# Patient Record
Sex: FEMALE | Race: BLACK OR AFRICAN AMERICAN | NOT HISPANIC OR LATINO | URBAN - METROPOLITAN AREA
[De-identification: names, ages, dates, MRNs, and addresses within clinical notes are randomized per-mention and may not be internally consistent; named-entity substitution may affect disease eponyms.]

---

## 2023-06-13 ENCOUNTER — EMERGENCY (EMERGENCY)
Facility: HOSPITAL | Age: 58
LOS: 1 days | Discharge: LEFT BEFORE DISCHARGE | End: 2023-06-13
Attending: STUDENT IN AN ORGANIZED HEALTH CARE EDUCATION/TRAINING PROGRAM
Payer: MEDICAID

## 2023-06-13 VITALS
HEIGHT: 72 IN | HEART RATE: 78 BPM | TEMPERATURE: 98 F | RESPIRATION RATE: 20 BRPM | OXYGEN SATURATION: 94 % | WEIGHT: 255.07 LBS | SYSTOLIC BLOOD PRESSURE: 141 MMHG | DIASTOLIC BLOOD PRESSURE: 107 MMHG

## 2023-06-13 VITALS
RESPIRATION RATE: 18 BRPM | OXYGEN SATURATION: 98 % | TEMPERATURE: 97 F | DIASTOLIC BLOOD PRESSURE: 91 MMHG | HEART RATE: 77 BPM | SYSTOLIC BLOOD PRESSURE: 134 MMHG

## 2023-06-13 PROCEDURE — 73552 X-RAY EXAM OF FEMUR 2/>: CPT | Mod: 26,RT

## 2023-06-13 PROCEDURE — 73502 X-RAY EXAM HIP UNI 2-3 VIEWS: CPT | Mod: 26,RT

## 2023-06-13 PROCEDURE — 96372 THER/PROPH/DIAG INJ SC/IM: CPT

## 2023-06-13 PROCEDURE — 99284 EMERGENCY DEPT VISIT MOD MDM: CPT | Mod: 25

## 2023-06-13 PROCEDURE — 73562 X-RAY EXAM OF KNEE 3: CPT | Mod: 26,RT

## 2023-06-13 PROCEDURE — 72170 X-RAY EXAM OF PELVIS: CPT

## 2023-06-13 PROCEDURE — 99285 EMERGENCY DEPT VISIT HI MDM: CPT

## 2023-06-13 PROCEDURE — 73502 X-RAY EXAM HIP UNI 2-3 VIEWS: CPT

## 2023-06-13 PROCEDURE — 93005 ELECTROCARDIOGRAM TRACING: CPT

## 2023-06-13 PROCEDURE — 73552 X-RAY EXAM OF FEMUR 2/>: CPT

## 2023-06-13 PROCEDURE — 73562 X-RAY EXAM OF KNEE 3: CPT

## 2023-06-13 RX ORDER — LIDOCAINE 4 G/100G
1 CREAM TOPICAL ONCE
Refills: 0 | Status: COMPLETED | OUTPATIENT
Start: 2023-06-13 | End: 2023-06-13

## 2023-06-13 RX ORDER — MORPHINE SULFATE 50 MG/1
4 CAPSULE, EXTENDED RELEASE ORAL ONCE
Refills: 0 | Status: DISCONTINUED | OUTPATIENT
Start: 2023-06-13 | End: 2023-06-13

## 2023-06-13 RX ORDER — KETOROLAC TROMETHAMINE 30 MG/ML
30 SYRINGE (ML) INJECTION ONCE
Refills: 0 | Status: DISCONTINUED | OUTPATIENT
Start: 2023-06-13 | End: 2023-06-13

## 2023-06-13 RX ORDER — ACETAMINOPHEN 500 MG
975 TABLET ORAL ONCE
Refills: 0 | Status: DISCONTINUED | OUTPATIENT
Start: 2023-06-13 | End: 2023-06-13

## 2023-06-13 RX ORDER — DIAZEPAM 5 MG
5 TABLET ORAL ONCE
Refills: 0 | Status: DISCONTINUED | OUTPATIENT
Start: 2023-06-13 | End: 2023-06-13

## 2023-06-13 RX ADMIN — Medication 30 MILLIGRAM(S): at 16:40

## 2023-06-13 RX ADMIN — Medication 5 MILLIGRAM(S): at 17:01

## 2023-06-13 RX ADMIN — LIDOCAINE 1 PATCH: 4 CREAM TOPICAL at 16:40
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
## 2023-06-13 NOTE — ED PROVIDER NOTE - PROGRESS NOTE DETAILS
Attending MD Alcantara: Reviewed XR findings with patient.  Reports that if it is not her bones "it is an attack from the devil".  Explained that if she is still having pain and is unable to walk, we can obtain at CT scan.  Declined.  Would like to trial ambulation.  Will trial. Patient reassessed, still reports moderate pain and refuses to road test.  I explained all imaging results with patient who verbalized understanding.  We recommended admission for pain control and PT eval, patient declined stating she wishes to leave and go to a state hospital if she does not have insurance.  I explained necessity for overnight admission if he stable to ambulate on her own without assistance, patient verbalized understanding would like to leave AGAINST MEDICAL ADVICE, states she will return to another hospital if she feels she has to come back.  The patient has decided to leave against medical advice.  The patient is AAOx3, not intoxicated, and displays normal decision making ability. We discussed all risks, benefits, and alternatives to the progression of treatment and the potential dangers of leaving including but not limited to permanent disability, injury, and death.  The patient was instructed that they are welcome to change their decision to leave against medical advice and return to the emergency department at any time and for any reason in order to allow us to render care. - Imer Stephens PA-C Attending MD Alcantara: Observed ambulating to bathroom with antalgic gait pt seen by RENETTA Benson called to see concern for safety at home, pt aox4 and demonstrated capacity, left AMA. stable at time of discharge - Imer Stephens PA-C

## 2023-06-13 NOTE — ED ADULT NURSE NOTE - NSFALLRISKINTERV_ED_ALL_ED

## 2023-06-13 NOTE — ED PROVIDER NOTE - CLINICAL SUMMARY MEDICAL DECISION MAKING FREE TEXT BOX
see attestation  no leg swelling unlikely DVT, strong DP pulse,normal perfusion unlikely ischemic limb, soft compartments in the buttocks, and b/l lower eextreemities unlikely compartment syndrome

## 2023-06-13 NOTE — ED PROVIDER NOTE - WR ORDER NAME 1
Normal vision: sees adequately in most situations; can see medication labels, newsprint Xray Pelvis AP only
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
## 2023-06-13 NOTE — ED PROVIDER NOTE - NS ED ATTENDING STATEMENT MOD
This was a shared visit with the PALMER. I reviewed and verified the documentation and independently performed the documented:
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
## 2023-06-13 NOTE — ED PROVIDER NOTE - PHYSICAL EXAMINATION
Patient is well appearing obese adult female in moderate distress, awake, alert and oriented x 3. NCAT, PERRLA, EOMI, Neck is supple, No LAD, CTA B/L with no respiratory distress,+S1S2 no murmurs. Abdomen:Soft nd/nt+bs no rebound or guarding. Lower extremities equal length without shortening, +ttp to anterolateral R hip/proximal femur without deformity. No midline or paraspinal ttp/deformities. Skin with no rash. Neuro CN II-XII grossly intact. Strength 5/5UE/LE. NmlSensation. gait not assessed during initial eval due to pain. pedal pulses palpable bilaterally

## 2023-06-13 NOTE — ED PROVIDER NOTE - NSFOLLOWUPINSTRUCTIONS_ED_ALL_ED_FT
Please follow up with your primary care doctor within 1 week.  Follow up with the [facility] by calling [phone].    Take ibuprofen 400-600mg every 6 hrs as needed for pain. Take with food  Take acetaminophen 500-1000mg every 6 hrs as needed for pain. DO NOT EXCEED 4000mg DAILY.    Rest no more than 24hrs then continue light activity as tolerated.    Return to the ED for worsening pain, difficulty walking, numbness/tingling/weakness in extremities, or any other concerns. Please follow up with your primary care doctor within 1 week.  Follow up with the [facility] by calling [phone].    Take ibuprofen 400-600mg every 6 hrs as needed for pain. Take with food  Take acetaminophen 500-1000mg every 6 hrs as needed for pain. DO NOT EXCEED 4000mg DAILY.    Rest no more than 24hrs then continue light activity as tolerated. Elevate leg at home for comfort.     Return to the ED for worsening pain, difficulty walking, numbness/tingling/weakness in extremities, fevers, numbness in your groin/buttocks, difficulty urinating, or any other concerns.

## 2024-06-06 NOTE — CHART NOTE - NSCHARTNOTEFT_GEN_A_CORE
Emergency Room - 58 year old female presented to the ED for lower back pain. Per medical team patient is refusing further medical testing and requesting to be discharged.  ADRIENNEW introduced herself to the patient and she verbalized understanding the role of the .  Patient is alert and oriented x 4 spheres.  Patient reports living alone in the [place] and independent in all ADLS. Rikki confirmed with patient she would like to be discharged and signed AMA papers with the medical team. ADRIENNEW assisted patient to the waiting room and she ambulated independently.  No further concerns voiced.  Patient advised she will make her own travel arrangements to her home. [phone]